# Patient Record
Sex: MALE | Race: WHITE
[De-identification: names, ages, dates, MRNs, and addresses within clinical notes are randomized per-mention and may not be internally consistent; named-entity substitution may affect disease eponyms.]

---

## 2017-01-31 ENCOUNTER — HOSPITAL ENCOUNTER (EMERGENCY)
Dept: HOSPITAL 17 - NEPC | Age: 22
Discharge: HOME | End: 2017-01-31
Payer: SELF-PAY

## 2017-01-31 VITALS — WEIGHT: 149.91 LBS | HEIGHT: 72 IN | BODY MASS INDEX: 20.31 KG/M2

## 2017-01-31 VITALS
RESPIRATION RATE: 16 BRPM | HEART RATE: 82 BPM | DIASTOLIC BLOOD PRESSURE: 68 MMHG | TEMPERATURE: 97.5 F | SYSTOLIC BLOOD PRESSURE: 126 MMHG | OXYGEN SATURATION: 100 %

## 2017-01-31 VITALS — SYSTOLIC BLOOD PRESSURE: 130 MMHG | DIASTOLIC BLOOD PRESSURE: 62 MMHG

## 2017-01-31 DIAGNOSIS — F12.90: ICD-10-CM

## 2017-01-31 DIAGNOSIS — J32.9: Primary | ICD-10-CM

## 2017-01-31 DIAGNOSIS — R11.2: ICD-10-CM

## 2017-01-31 DIAGNOSIS — J02.9: ICD-10-CM

## 2017-01-31 LAB
ALP SERPL-CCNC: 84 U/L (ref 45–117)
ALT SERPL-CCNC: 28 U/L (ref 9–52)
ANION GAP SERPL CALC-SCNC: 14 MEQ/L (ref 5–15)
AST SERPL-CCNC: 17 U/L (ref 15–39)
BASOPHILS # BLD AUTO: 0 TH/MM3 (ref 0–0.2)
BASOPHILS NFR BLD: 0.1 % (ref 0–2)
BILIRUB SERPL-MCNC: 0.6 MG/DL (ref 0.2–1)
BUN SERPL-MCNC: 8 MG/DL (ref 7–18)
CHLORIDE SERPL-SCNC: 99 MEQ/L (ref 98–107)
COLOR UR: YELLOW
COMMENT (UR): (no result)
CULTURE IF INDICATED: (no result)
EOSINOPHIL # BLD: 0 TH/MM3 (ref 0–0.4)
EOSINOPHIL NFR BLD: 0 % (ref 0–4)
ERYTHROCYTE [DISTWIDTH] IN BLOOD BY AUTOMATED COUNT: 12.1 % (ref 11.6–17.2)
GFR SERPLBLD BASED ON 1.73 SQ M-ARVRAT: 107 ML/MIN (ref 89–?)
GLUCOSE UR STRIP-MCNC: (no result) MG/DL
HCO3 BLD-SCNC: 22.4 MEQ/L (ref 21–32)
HCT VFR BLD CALC: 42.6 % (ref 39–51)
HEMO FLAGS: (no result)
HGB UR QL STRIP: (no result)
KETONES UR STRIP-MCNC: 40 MG/DL
LYMPHOCYTES # BLD AUTO: 1.7 TH/MM3 (ref 1–4.8)
LYMPHOCYTES NFR BLD AUTO: 12.7 % (ref 9–44)
MCH RBC QN AUTO: 30.5 PG (ref 27–34)
MCHC RBC AUTO-ENTMCNC: 35.1 % (ref 32–36)
MCV RBC AUTO: 87 FL (ref 80–100)
MONOCYTES NFR BLD: 6 % (ref 0–8)
NEUTROPHILS # BLD AUTO: 11 TH/MM3 (ref 1.8–7.7)
NEUTROPHILS NFR BLD AUTO: 81.2 % (ref 16–70)
NITRITE UR QL STRIP: (no result)
PLATELET # BLD: 193 TH/MM3 (ref 150–450)
POTASSIUM SERPL-SCNC: 3.7 MEQ/L (ref 3.5–5.1)
RBC # BLD AUTO: 4.9 MIL/MM3 (ref 4.5–5.9)
SODIUM SERPL-SCNC: 135 MEQ/L (ref 136–145)
SP GR UR STRIP: 1.01 (ref 1–1.03)
WBC # BLD AUTO: 13.6 TH/MM3 (ref 4–11)

## 2017-01-31 PROCEDURE — 83690 ASSAY OF LIPASE: CPT

## 2017-01-31 PROCEDURE — 81001 URINALYSIS AUTO W/SCOPE: CPT

## 2017-01-31 PROCEDURE — 96374 THER/PROPH/DIAG INJ IV PUSH: CPT

## 2017-01-31 PROCEDURE — 87880 STREP A ASSAY W/OPTIC: CPT

## 2017-01-31 PROCEDURE — 87081 CULTURE SCREEN ONLY: CPT

## 2017-01-31 PROCEDURE — 70450 CT HEAD/BRAIN W/O DYE: CPT

## 2017-01-31 PROCEDURE — 96375 TX/PRO/DX INJ NEW DRUG ADDON: CPT

## 2017-01-31 PROCEDURE — 96361 HYDRATE IV INFUSION ADD-ON: CPT

## 2017-01-31 PROCEDURE — 99284 EMERGENCY DEPT VISIT MOD MDM: CPT

## 2017-01-31 PROCEDURE — 85025 COMPLETE CBC W/AUTO DIFF WBC: CPT

## 2017-01-31 PROCEDURE — 80053 COMPREHEN METABOLIC PANEL: CPT

## 2017-01-31 PROCEDURE — 87804 INFLUENZA ASSAY W/OPTIC: CPT

## 2017-01-31 NOTE — RADRPT
EXAM DATE/TIME:  01/31/2017 19:25 

 

[facility] COMPARISON:     

No previous studies available for comparison.

 

 

INDICATIONS :     

Severe cephalgia with nausea and vomiting for 1 week.

                      

 

RADIATION DOSE:     

34.28 CTDIvol (mGy) 

 

 

 

MEDICAL HISTORY :     

None  

 

SURGICAL HISTORY :      

None. 

 

ENCOUNTER:      

Initial

 

ACUITY:      

1 week

 

PAIN SCALE:      

10/10

 

LOCATION:        

cranial 

 

TECHNIQUE:     

Multiple contiguous axial images were obtained of the head.  Using automated exposure control and adj
ustment of the mA and/or kV according to patient size, radiation dose was kept as low as reasonably a
chievable to obtain optimal diagnostic quality images. 

 

        FINDINGS:

 

CEREBRUM:     

The ventricles are normal for age.  No evidence of midline shift, mass lesion, hemorrhage or acute in
farction.  No extra-axial fluid collections are seen.

 

POSTERIOR FOSSA:     

The cerebellum and brainstem are intact.  The 4th ventricle is midline.  The cerebellopontine angle i
s unremarkable.

 

EXTRACRANIAL:     

The visualized portion of the orbits is intact.

 

SKULL:     

The calvaria is intact.  No evidence of skull fracture.

 

CONCLUSION:     

No acute disease.  

 

 

 

 Sahil Alvarenga MD on January 31, 2017 at 19:54           

Board Certified Radiologist.

 This report was verified electronically.
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
## 2017-01-31 NOTE — PD
Physical Exam


Narrative


General: 


The patient is a well-developed well-nourished male in no acute distress. 





Head and Neck exam: 


Head is normocephalic atraumatic. 


Eyes: Pupils are equal round and reactive to light.  Tympanic membranes are 

pearly with clear fluid present posterior to them.


Nose: Midline septum with erythematous edematous nasal mucosa and a clear nasal 

discharge.


Mouth: Dentition unremarkable. Moist mucus membranes. Posterior oropharynx is 

mildly erythematous. No tonsillar hypertrophy. Uvula midline. Airway patent. 


Neck: The patient has anterior tonsil lymphadenopathy with tenderness on 

palpation on the right. No nuchal rigidity. No thyromegaly. 





Cardiovascular: 


Regular rate and rhythm without murmurs, gallops, or rubs. 





Lungs: 


Clear to auscultation bilaterally. No wheezes, rhonchi, or rales.


 


Abdomen:


Soft, without tenderness to palpation in all 4 quadrants of the abdomen. No 

guarding, rebound, or rigidity.  Normal bowel sounds are audible.





Extremities: 


No clubbing, cyanosis, or edema. 2+ pulses in all 4 extremities. 





Back: 


No spinous process tenderness to palpation. No costovertebral angle tenderness 

to palpation. 





Neurologic Exam: Grossly nonfocal.  





Skin Exam: No rash noted. Intact skin that is warm and dry.





Data


Data


Last Documented VS





Vital Signs








  Date Time  Temp Pulse Resp B/P Pulse Ox O2 Delivery O2 Flow Rate FiO2


 


1/31/17 17:25 97.5 82 16 126/68 100 Room Air  








Orders





 Complete Blood Count With Diff (1/31/17 19:06)


Comprehensive Metabolic Panel (1/31/17 19:06)


Lipase (1/31/17 19:06)


Iv Access Insert/Monitor (1/31/17 19:06)


Ecg Monitoring (1/31/17 19:06)


Oximetry (1/31/17 19:06)


NPO (1/31/17 19:06)


Ondansetron Inj (Zofran Inj) (1/31/17 19:15)


Sodium Chlor 0.9% 1000 Ml Inj (Ns 1000 M (1/31/17 19:06)


Sodium Chloride 0.9% Flush (Ns Flush) (1/31/17 19:15)


Ct Brain W/O Iv Contrast(Rout) (1/31/17 )


Influenzae A/B Antigen (1/31/17 19:06)


Group A Rapid Strep Screen (1/31/17 19:14)


Strep Culture (Group A) (1/31/17 19:15)


Urinalysis - C+S If Indicated (1/31/17 19:55)


Ketorolac Inj (Toradol Inj) (1/31/17 20:00)


Sodium Chlor 0.9% 1000 Ml Inj (Ns 1000 M (1/31/17 20:15)


Ceftriaxone Inj (Rocephin Inj) (1/31/17 20:15)





Labs





 Laboratory Tests








Test 1/31/17





 19:02


 


White Blood Count 13.6 TH/MM3


 


Red Blood Count 4.90 MIL/MM3


 


Hemoglobin 15.0 GM/DL


 


Hematocrit 42.6 %


 


Mean Corpuscular Volume 87.0 FL


 


Mean Corpuscular Hemoglobin 30.5 PG


 


Mean Corpuscular Hemoglobin 35.1 %





Concent 


 


Red Cell Distribution Width 12.1 %


 


Platelet Count 193 TH/MM3


 


Mean Platelet Volume 9.1 FL


 


Neutrophils (%) (Auto) 81.2 %


 


Lymphocytes (%) (Auto) 12.7 %


 


Monocytes (%) (Auto) 6.0 %


 


Eosinophils (%) (Auto) 0.0 %


 


Basophils (%) (Auto) 0.1 %


 


Neutrophils # (Auto) 11.0 TH/MM3


 


Lymphocytes # (Auto) 1.7 TH/MM3


 


Monocytes # (Auto) 0.8 TH/MM3


 


Eosinophils # (Auto) 0.0 TH/MM3


 


Basophils # (Auto) 0.0 TH/MM3


 


CBC Comment DIFF FINAL 


 


Differential Comment  


 


Sodium Level 135 MEQ/L


 


Potassium Level 3.7 MEQ/L


 


Chloride Level 99 MEQ/L


 


Carbon Dioxide Level 22.4 MEQ/L


 


Anion Gap 14 MEQ/L


 


Blood Urea Nitrogen 8 MG/DL


 


Creatinine 0.91 MG/DL


 


Estimat Glomerular Filtration 107 ML/MIN





Rate 


 


Random Glucose 102 MG/DL


 


Calcium Level 9.5 MG/DL


 


Total Bilirubin 0.6 MG/DL


 


Aspartate Amino Transf 17 U/L





(AST/SGOT) 


 


Alanine Aminotransferase 28 U/L





(ALT/SGPT) 


 


Alkaline Phosphatase 84 U/L


 


Total Protein 8.7 GM/DL


 


Albumin 4.4 GM/DL


 


Lipase 108 U/L











[facility]


Medical Record Reviewed:  Yes


Supervised Visit with LADONNA:  Yes


Interpretation(s)





Last Impressions








Head CT 1/31/17 0000 Signed





Impressions: 





 Service Date/Time:  Tuesday, January 31, 2017 19:25 - CONCLUSION:  No acute 





 disease.       Sahil Alvarenga MD 








Differential Diagnosis


Tension headache, versus migraine headache, versus acute sinusitis, versus 

headache related to viral syndrome, versus dehydration


Narrative Course


During the course of the patients emergency department visit, the patients 

history, examination, and differential diagnosis were reviewed with the 

patient. The patient had  IV access obtained and blood work sent for analysis.  

The patient was placed on a cardiac monitor with oximetry and blood pressure 

monitoring.  The patient was initially evaluated by Jose F, the physician 

assistant.  Please see his complete history and physical.  





The patient was provided normal saline at 1 L IV fluid bolus.  Toradol for pain

, Zofran for nausea, Rocephin 1 g IV.








The patients laboratory studies were reviewed and remarkable for a white count 

of 13.6, hemoglobin 15, platelets 193 with 81.2 neutrophils, CMP is remarkable 

for sodium of 135, total protein 8.7, lipase 108





 Radiology studies were reviewed and remarkable for a CT scan of the brain 

shows no acute abnormality.





The patient was instructed to discontinue the ciprofloxacin that was previously 

prescribed.  The patient instead will be given a prescription for Augmentin and 

Zofran for nausea.





The patient is resting comfortably and feels better, is alert and in no 

distress. The patients results and examination findings were discussed with 

the patient.  The repeat examination is unremarkable and benign. The history, 

exam, diagnostic testing, and current condition do not suggest any significant 

pathology to warrant further testing, continued ED treatment, admission, or 

surgical evaluation at this point. The vital signs have been stable. The 

patient does not have uncontrollable pain, intractable vomiting, or other 

significant symptoms. The patient's condition is stable and appropriate for 

discharge. The patient will pursue further outpatient evaluation with a primary 

care physician or other designated or consulting physician as indicated in the 

discharge instructions. The patient expressed understanding and was agreeable 

with this plan.








Avis Chun MD Jan 31, 2017 19:57

## 2018-06-23 ENCOUNTER — HOSPITAL ENCOUNTER (EMERGENCY)
Dept: HOSPITAL 17 - NEPD | Age: 23
LOS: 1 days | Discharge: HOME | End: 2018-06-24
Payer: SELF-PAY

## 2018-06-23 VITALS — HEIGHT: 66 IN | WEIGHT: 154.32 LBS | BODY MASS INDEX: 24.8 KG/M2

## 2018-06-23 VITALS
TEMPERATURE: 97.7 F | HEART RATE: 71 BPM | OXYGEN SATURATION: 100 % | DIASTOLIC BLOOD PRESSURE: 63 MMHG | RESPIRATION RATE: 18 BRPM | SYSTOLIC BLOOD PRESSURE: 134 MMHG

## 2018-06-23 DIAGNOSIS — S52.502A: Primary | ICD-10-CM
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
## 2018-06-23 PROCEDURE — 29125 APPL SHORT ARM SPLINT STATIC: CPT

## 2018-06-23 PROCEDURE — 73110 X-RAY EXAM OF WRIST: CPT
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
